# Patient Record
Sex: FEMALE | Race: WHITE | NOT HISPANIC OR LATINO | Employment: STUDENT | URBAN - METROPOLITAN AREA
[De-identification: names, ages, dates, MRNs, and addresses within clinical notes are randomized per-mention and may not be internally consistent; named-entity substitution may affect disease eponyms.]

---
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
## 2022-07-26 PROBLEM — F12.20 CANNABIS DEPENDENCE, DAILY USE (HCC): Status: ACTIVE | Noted: 2022-07-26

## 2022-07-26 PROBLEM — S06.0X0A CONCUSSION WITHOUT LOSS OF CONSCIOUSNESS: Status: RESOLVED | Noted: 2017-08-10 | Resolved: 2022-07-26

## 2022-07-26 PROBLEM — F41.9 ANXIETY DISORDER, UNSPECIFIED: Status: ACTIVE | Noted: 2022-07-26

## 2024-08-06 ENCOUNTER — APPOINTMENT (OUTPATIENT)
Dept: CT IMAGING | Facility: HOSPITAL | Age: 24
End: 2024-08-06
Attending: EMERGENCY MEDICINE
Payer: COMMERCIAL

## 2024-08-06 ENCOUNTER — HOSPITAL ENCOUNTER (EMERGENCY)
Facility: HOSPITAL | Age: 24
Discharge: HOME | End: 2024-08-06
Attending: EMERGENCY MEDICINE
Payer: COMMERCIAL

## 2024-08-06 VITALS
DIASTOLIC BLOOD PRESSURE: 51 MMHG | WEIGHT: 135 LBS | RESPIRATION RATE: 16 BRPM | HEIGHT: 63 IN | TEMPERATURE: 101 F | BODY MASS INDEX: 23.92 KG/M2 | SYSTOLIC BLOOD PRESSURE: 109 MMHG | HEART RATE: 90 BPM | OXYGEN SATURATION: 99 %

## 2024-08-06 DIAGNOSIS — N12 PYELONEPHRITIS: Primary | ICD-10-CM

## 2024-08-06 LAB
ALBUMIN SERPL-MCNC: 4.6 G/DL (ref 3.2–4.8)
ALBUMIN/GLOB SERPL: 1.6 {RATIO} (ref 1–2)
ALP LIVER SERPL-CCNC: 68 U/L
ALT SERPL-CCNC: 10 U/L
ANION GAP SERPL CALC-SCNC: 8 MMOL/L (ref 0–18)
AST SERPL-CCNC: 18 U/L (ref ?–34)
B-HCG UR QL: NEGATIVE
BASOPHILS # BLD AUTO: 0.02 X10(3) UL (ref 0–0.2)
BASOPHILS NFR BLD AUTO: 0.1 %
BILIRUB SERPL-MCNC: 0.6 MG/DL (ref 0.3–1.2)
BILIRUB UR QL: NEGATIVE
BUN BLD-MCNC: 10 MG/DL (ref 9–23)
BUN/CREAT SERPL: 12.5 (ref 10–20)
CALCIUM BLD-MCNC: 9.5 MG/DL (ref 8.7–10.4)
CHLORIDE SERPL-SCNC: 103 MMOL/L (ref 98–112)
CO2 SERPL-SCNC: 25 MMOL/L (ref 21–32)
CREAT BLD-MCNC: 0.8 MG/DL
DEPRECATED RDW RBC AUTO: 40.4 FL (ref 35.1–46.3)
EGFRCR SERPLBLD CKD-EPI 2021: 106 ML/MIN/1.73M2 (ref 60–?)
EOSINOPHIL # BLD AUTO: 0 X10(3) UL (ref 0–0.7)
EOSINOPHIL NFR BLD AUTO: 0 %
ERYTHROCYTE [DISTWIDTH] IN BLOOD BY AUTOMATED COUNT: 12.3 % (ref 11–15)
GLOBULIN PLAS-MCNC: 2.9 G/DL (ref 2–3.5)
GLUCOSE BLD-MCNC: 112 MG/DL (ref 70–99)
GLUCOSE UR-MCNC: NORMAL MG/DL
HCT VFR BLD AUTO: 36.3 %
HGB BLD-MCNC: 12.8 G/DL
IMM GRANULOCYTES # BLD AUTO: 0.05 X10(3) UL (ref 0–1)
IMM GRANULOCYTES NFR BLD: 0.4 %
LEUKOCYTE ESTERASE UR QL STRIP.AUTO: 500
LIPASE SERPL-CCNC: 26 U/L (ref 12–53)
LYMPHOCYTES # BLD AUTO: 0.5 X10(3) UL (ref 1–4)
LYMPHOCYTES NFR BLD AUTO: 3.5 %
MCH RBC QN AUTO: 31.3 PG (ref 26–34)
MCHC RBC AUTO-ENTMCNC: 35.3 G/DL (ref 31–37)
MCV RBC AUTO: 88.8 FL
MONOCYTES # BLD AUTO: 1.01 X10(3) UL (ref 0.1–1)
MONOCYTES NFR BLD AUTO: 7.1 %
NEUTROPHILS # BLD AUTO: 12.61 X10 (3) UL (ref 1.5–7.7)
NEUTROPHILS # BLD AUTO: 12.61 X10(3) UL (ref 1.5–7.7)
NEUTROPHILS NFR BLD AUTO: 88.9 %
OSMOLALITY SERPL CALC.SUM OF ELEC: 282 MOSM/KG (ref 275–295)
PH UR: 8 [PH] (ref 5–8)
PLATELET # BLD AUTO: 232 10(3)UL (ref 150–450)
POTASSIUM SERPL-SCNC: 3.6 MMOL/L (ref 3.5–5.1)
PROT SERPL-MCNC: 7.5 G/DL (ref 5.7–8.2)
PROT UR-MCNC: 50 MG/DL
RBC # BLD AUTO: 4.09 X10(6)UL
RBC #/AREA URNS AUTO: >10 /HPF
SODIUM SERPL-SCNC: 136 MMOL/L (ref 136–145)
SP GR UR STRIP: 1.01 (ref 1–1.03)
UROBILINOGEN UR STRIP-ACNC: NORMAL
WBC # BLD AUTO: 14.2 X10(3) UL (ref 4–11)
WBC #/AREA URNS AUTO: >50 /HPF

## 2024-08-06 PROCEDURE — 99284 EMERGENCY DEPT VISIT MOD MDM: CPT

## 2024-08-06 PROCEDURE — 80053 COMPREHEN METABOLIC PANEL: CPT | Performed by: EMERGENCY MEDICINE

## 2024-08-06 PROCEDURE — 96375 TX/PRO/DX INJ NEW DRUG ADDON: CPT

## 2024-08-06 PROCEDURE — 99285 EMERGENCY DEPT VISIT HI MDM: CPT

## 2024-08-06 PROCEDURE — 96365 THER/PROPH/DIAG IV INF INIT: CPT

## 2024-08-06 PROCEDURE — 81001 URINALYSIS AUTO W/SCOPE: CPT | Performed by: EMERGENCY MEDICINE

## 2024-08-06 PROCEDURE — 83690 ASSAY OF LIPASE: CPT | Performed by: EMERGENCY MEDICINE

## 2024-08-06 PROCEDURE — 74176 CT ABD & PELVIS W/O CONTRAST: CPT | Performed by: EMERGENCY MEDICINE

## 2024-08-06 PROCEDURE — 85025 COMPLETE CBC W/AUTO DIFF WBC: CPT | Performed by: EMERGENCY MEDICINE

## 2024-08-06 PROCEDURE — 96361 HYDRATE IV INFUSION ADD-ON: CPT

## 2024-08-06 PROCEDURE — 81025 URINE PREGNANCY TEST: CPT

## 2024-08-06 PROCEDURE — 87086 URINE CULTURE/COLONY COUNT: CPT | Performed by: EMERGENCY MEDICINE

## 2024-08-06 RX ORDER — KETOROLAC TROMETHAMINE 15 MG/ML
15 INJECTION, SOLUTION INTRAMUSCULAR; INTRAVENOUS ONCE
Status: COMPLETED | OUTPATIENT
Start: 2024-08-06 | End: 2024-08-06

## 2024-08-06 RX ORDER — CEFDINIR 300 MG/1
300 CAPSULE ORAL 2 TIMES DAILY
Qty: 20 CAPSULE | Refills: 0 | Status: SHIPPED | OUTPATIENT
Start: 2024-08-06 | End: 2024-08-06

## 2024-08-06 RX ORDER — IBUPROFEN 600 MG/1
600 TABLET ORAL EVERY 8 HOURS PRN
Qty: 15 TABLET | Refills: 0 | Status: SHIPPED | OUTPATIENT
Start: 2024-08-06 | End: 2024-08-12

## 2024-08-06 RX ORDER — ONDANSETRON 4 MG/1
4 TABLET, ORALLY DISINTEGRATING ORAL EVERY 8 HOURS PRN
Qty: 6 TABLET | Refills: 0 | Status: SHIPPED | OUTPATIENT
Start: 2024-08-06 | End: 2024-08-12

## 2024-08-06 RX ORDER — ACETAMINOPHEN 325 MG/1
650 TABLET ORAL ONCE
Status: COMPLETED | OUTPATIENT
Start: 2024-08-06 | End: 2024-08-06

## 2024-08-06 RX ORDER — ONDANSETRON 4 MG/1
4 TABLET, ORALLY DISINTEGRATING ORAL EVERY 8 HOURS PRN
Qty: 6 TABLET | Refills: 0 | Status: SHIPPED | OUTPATIENT
Start: 2024-08-06 | End: 2024-08-06

## 2024-08-06 RX ORDER — ONDANSETRON 2 MG/ML
4 INJECTION INTRAMUSCULAR; INTRAVENOUS ONCE
Status: COMPLETED | OUTPATIENT
Start: 2024-08-06 | End: 2024-08-06

## 2024-08-06 RX ORDER — IBUPROFEN 600 MG/1
600 TABLET ORAL EVERY 8 HOURS PRN
Qty: 15 TABLET | Refills: 0 | Status: SHIPPED | OUTPATIENT
Start: 2024-08-06 | End: 2024-08-06

## 2024-08-06 RX ORDER — CEFDINIR 300 MG/1
300 CAPSULE ORAL 2 TIMES DAILY
Qty: 20 CAPSULE | Refills: 0 | Status: SHIPPED | OUTPATIENT
Start: 2024-08-06 | End: 2024-08-12

## 2024-08-06 NOTE — ED PROVIDER NOTES
Patient Seen in: [facility] Emergency Department      History     Chief Complaint   Patient presents with    Flank Pain     Stated Complaint: R Flank Pain    Subjective:   HPI    25 y/o female here w/ Mom for eval of R flank pain, some nausea and concern for recurrence of kidney infection.  She lives in [place] but is visiting.  About a month ago she was treated with antibiotics after visiting a medical facility ER and diagnosed with a left kidney infection.  No significant history.  No abdominal surgical history.  No fever.  Mom did give her a dose of Levaquin and pretty in the left of her home this morning but she vomited up shortly thereafter.  No family or personal history of kidney stones but no imaging at that time reportedly.    Objective:   Past Medical History:    Anxiety    Concussion without loss of consciousness    4/2017    Depression    Encounter for IUD insertion    Panic attacks    Patellofemoral syndrome, bilateral              Past Surgical History:   Procedure Laterality Date    Mirena, iud  12/18/2020    Sinus surgery    07/24/2020                Social History     Socioeconomic History    Marital status: Single   Tobacco Use    Smoking status: Never    Smokeless tobacco: Never   Vaping Use    Vaping status: Never Used   Substance and Sexual Activity    Alcohol use: Yes     Alcohol/week: 10.0 - 12.0 standard drinks of alcohol     Types: 10 - 12 Standard drinks or equivalent per week     Comment: weekends    Drug use: Yes     Frequency: 7.0 times per week     Types: Cannabis     Comment: smokes plant form nightly    Sexual activity: Yes     Partners: Male     Birth control/protection: I.U.D.              Review of Systems    Positive for stated Chief Complaint: Flank Pain    Other systems are as noted in HPI.  Constitutional and vital signs reviewed.      All other systems reviewed and negative except as noted above.    Physical Exam     ED Triage Vitals [08/06/24 1439]   /68   Pulse  94   Resp 16   Temp 99 °F (37.2 °C)   Temp src Oral   SpO2 99 %   O2 Device None (Room air)       Current Vitals:   Vital Signs  BP: 111/62  Pulse: 81  Resp: 16  Temp: (!) 101 °F (38.3 °C)  Temp src: Temporal  MAP (mmHg): 84    Oxygen Therapy  SpO2: 100 %  O2 Device: None (Room air)            Physical Exam    Constitutional: Oriented to person, place, and time.  Appears well-developed. No distress.   Head: Normocephalic and atraumatic.   Eyes: Conjunctivae are normal. Pupils are equal, round, and reactive to light.   Cardiovascular: Normal rate, regular rhythm and intact distal pulses.    Pulmonary/Chest: Effort normal. No respiratory distress.   Abdominal: Soft. There is no tenderness. There is no guarding.  Mild right flank pain only.  Musculoskeletal: Normal range of motion. No edema or tenderness.   Neurological: Alert and oriented to person, place, and time.   Skin: Skin is warm and dry.     Nursing note and vitals reviewed.    Differential diagnosis includes recurrence UTI and pyelonephritis, obstructive uropathy, less likely perinephric abscess, renal insufficiency.      ED Course     Labs Reviewed   CBC WITH DIFFERENTIAL WITH PLATELET - Abnormal; Notable for the following components:       Result Value    WBC 14.2 (*)     Neutrophil Absolute Prelim 12.61 (*)     Neutrophil Absolute 12.61 (*)     Lymphocyte Absolute 0.50 (*)     Monocyte Absolute 1.01 (*)     All other components within normal limits   COMP METABOLIC PANEL (14) - Abnormal; Notable for the following components:    Glucose 112 (*)     All other components within normal limits   URINALYSIS WITH CULTURE REFLEX - Abnormal; Notable for the following components:    Clarity Urine Turbid (*)     Ketones Urine Trace (*)     Blood Urine 1+ (*)     Protein Urine 50 (*)     Nitrite Urine 1+ (*)     Leukocyte Esterase Urine 500 (*)     WBC Urine >50 (*)     RBC Urine >10 (*)     Bacteria Urine 1+ (*)     Squamous Epi. Cells Few (*)     All other  components within normal limits   LIPASE - Normal   POCT PREGNANCY URINE - Normal   URINE CULTURE, ROUTINE             CT ABDOMEN+PELVIS(CPT=74176)    Result Date: 8/6/2024  PROCEDURE:   CT ABDOMEN+PELVIS(CPT=74176)  COMPARISON:   None.  INDICATIONS:   Right flank pain.  TECHNIQUE: CT images of the abdomen and pelvis were obtained without intravenous contrast material.  Automated exposure control for dose reduction was used. Adjustment of the mA and/or kV was done based on the patient's size. Use of iterative reconstruction technique for dose reduction was used.  Dose information is transmitted to the ACR (American College of Radiology) NRDR (National Radiology Data Registry) which includes the Dose Index Registry.  FINDINGS:  URINARY TRACT: Asymmetric right perinephric stranding with trace perinephric fluid.  Mild urothelial thickening involving the right renal pelvis seen on image 59 series 2, image 29 series 5. .  No contour deforming renal mass. No hydroureteronephrosis or  urinary tract calculus.  Mild mural thickening of urinary bladder. ADRENALS: Normal unenhanced adrenals.  LIVER: Normal unenhanced liver. BILIARY: No evidence for cholecystitis or biliary dilatation. PANCREAS: Normal unenhanced pancreas.  SPLEEN: Normal unenhanced spleen.  AORTA/VASCULAR: No aneurysm. LYMPHADENOPATHY: None. GI/MESENTERY: Normal appendix.  Large amount of stool in the colon.  No obstruction, bowel wall thickening or mesenteric mass.  ABDOMINAL WALL: Normal.  No mass or hernia.  ASCITES:   None PELVIC ORGANS: No suspect pelvic mass.  Intrauterine device. BONES: No suspect bone lesion.  Mild levoscoliosis. LUNG BASES: Normal.  OTHER: Negative.            CONCLUSION:  1. Cystitis with ascending urinary tract infection involving right renal collecting system and kidney.  Based on perinephric stranding and trace fluid suspect right pyelonephritis.  No urinary tract calculus.     Dictated by (CST): Hayden Cameron MD on 8/06/2024 at  5:57 PM     Finalized by (CST): Hayden Cameron MD on 8/06/2024 at 6:02 PM                  [facility]                                         Medical Decision Making  Blood pressure has been stable.  Not tachycardic.  Fever resolved.  Feels improved.  1 dose of Rocephin.  Will go on Omnicef fluids ibuprofen and Zofran.  Encourage completion of antibiotics.  Follow-up with her doctor come back with worsening or change.  Tylenol for pain as well.    Problems Addressed:  Pyelonephritis: acute illness or injury with systemic symptoms    Amount and/or Complexity of Data Reviewed  Labs: ordered. Decision-making details documented in ED Course.  Radiology: ordered and independent interpretation performed. Decision-making details documented in ED Course.     Details: By my review of the CT abdomen/pelvis there is no obvious evidence of bowel obstruction, free intraperitoneal air or significant free fluid.    Risk  OTC drugs.  Prescription drug management.  Decision regarding hospitalization.        Disposition and Plan     Clinical Impression:  1. Pyelonephritis         Disposition:  Discharge  8/6/2024  6:41 pm    Follow-up:  Bob Rodriguez MD  [address] [place] [address] [id number] [phone]    Call  As needed          Medications Prescribed:  Current Discharge Medication List        START taking these medications    Details   cefdinir 300 MG Oral Cap Take 1 capsule (300 mg total) by mouth 2 (two) times daily for 10 days.  Qty: 20 capsule, Refills: 0      ondansetron 4 MG Oral Tablet Dispersible Take 1 tablet (4 mg total) by mouth every 8 (eight) hours as needed for Nausea.  Qty: 6 tablet, Refills: 0      ibuprofen 600 MG Oral Tab Take 1 tablet (600 mg total) by mouth every 8 (eight) hours as needed for Pain or Fever.  Qty: 15 tablet, Refills: 0

## 2024-08-06 NOTE — ED INITIAL ASSESSMENT (HPI)
Pt came in for right flank pain since yesterday.  With N/V.  Pt is A/Ox  4, breathing unlabored.  Per mom pt had 1 dose of Levaquin today but threw ap after.

## 2024-08-10 ENCOUNTER — APPOINTMENT (OUTPATIENT)
Dept: ULTRASOUND IMAGING | Facility: HOSPITAL | Age: 24
End: 2024-08-10
Attending: EMERGENCY MEDICINE
Payer: COMMERCIAL

## 2024-08-10 ENCOUNTER — HOSPITAL ENCOUNTER (OUTPATIENT)
Facility: HOSPITAL | Age: 24
Setting detail: OBSERVATION
Discharge: HOME | End: 2024-08-12
Attending: EMERGENCY MEDICINE | Admitting: INTERNAL MEDICINE
Payer: COMMERCIAL

## 2024-08-10 DIAGNOSIS — R10.9 RIGHT FLANK PAIN: ICD-10-CM

## 2024-08-10 DIAGNOSIS — N12 PYELONEPHRITIS: Primary | ICD-10-CM

## 2024-08-10 LAB
ALBUMIN SERPL-MCNC: 4.2 G/DL (ref 3.2–4.8)
ALBUMIN/GLOB SERPL: 1.4 {RATIO} (ref 1–2)
ALP LIVER SERPL-CCNC: 63 U/L
ALT SERPL-CCNC: 11 U/L
ANION GAP SERPL CALC-SCNC: 6 MMOL/L (ref 0–18)
AST SERPL-CCNC: 12 U/L (ref ?–34)
B-HCG UR QL: NEGATIVE
BASOPHILS # BLD AUTO: 0.03 X10(3) UL (ref 0–0.2)
BASOPHILS NFR BLD AUTO: 0.3 %
BILIRUB SERPL-MCNC: 0.2 MG/DL (ref 0.3–1.2)
BILIRUB UR QL: NEGATIVE
BUN BLD-MCNC: 7 MG/DL (ref 9–23)
BUN/CREAT SERPL: 10.4 (ref 10–20)
CALCIUM BLD-MCNC: 9.6 MG/DL (ref 8.7–10.4)
CHLORIDE SERPL-SCNC: 105 MMOL/L (ref 98–112)
CLARITY UR: CLEAR
CO2 SERPL-SCNC: 27 MMOL/L (ref 21–32)
COLOR UR: COLORLESS
CREAT BLD-MCNC: 0.67 MG/DL
DEPRECATED RDW RBC AUTO: 43.5 FL (ref 35.1–46.3)
EGFRCR SERPLBLD CKD-EPI 2021: 126 ML/MIN/1.73M2 (ref 60–?)
EOSINOPHIL # BLD AUTO: 0.04 X10(3) UL (ref 0–0.7)
EOSINOPHIL NFR BLD AUTO: 0.4 %
ERYTHROCYTE [DISTWIDTH] IN BLOOD BY AUTOMATED COUNT: 13 % (ref 11–15)
GLOBULIN PLAS-MCNC: 2.9 G/DL (ref 2–3.5)
GLUCOSE BLD-MCNC: 111 MG/DL (ref 70–99)
GLUCOSE UR-MCNC: NORMAL MG/DL
HCT VFR BLD AUTO: 33.3 %
HGB BLD-MCNC: 11.3 G/DL
HGB UR QL STRIP.AUTO: NEGATIVE
IMM GRANULOCYTES # BLD AUTO: 0.04 X10(3) UL (ref 0–1)
IMM GRANULOCYTES NFR BLD: 0.4 %
KETONES UR-MCNC: NEGATIVE MG/DL
LEUKOCYTE ESTERASE UR QL STRIP.AUTO: NEGATIVE
LYMPHOCYTES # BLD AUTO: 2.06 X10(3) UL (ref 1–4)
LYMPHOCYTES NFR BLD AUTO: 22.2 %
MCH RBC QN AUTO: 30.9 PG (ref 26–34)
MCHC RBC AUTO-ENTMCNC: 33.9 G/DL (ref 31–37)
MCV RBC AUTO: 91 FL
MONOCYTES # BLD AUTO: 1.01 X10(3) UL (ref 0.1–1)
MONOCYTES NFR BLD AUTO: 10.9 %
NEUTROPHILS # BLD AUTO: 6.08 X10 (3) UL (ref 1.5–7.7)
NEUTROPHILS # BLD AUTO: 6.08 X10(3) UL (ref 1.5–7.7)
NEUTROPHILS NFR BLD AUTO: 65.8 %
NITRITE UR QL STRIP.AUTO: NEGATIVE
OSMOLALITY SERPL CALC.SUM OF ELEC: 285 MOSM/KG (ref 275–295)
PH UR: 5.5 [PH] (ref 5–8)
PLATELET # BLD AUTO: 273 10(3)UL (ref 150–450)
POTASSIUM SERPL-SCNC: 3.2 MMOL/L (ref 3.5–5.1)
PROT SERPL-MCNC: 7.1 G/DL (ref 5.7–8.2)
PROT UR-MCNC: NEGATIVE MG/DL
RBC # BLD AUTO: 3.66 X10(6)UL
SODIUM SERPL-SCNC: 138 MMOL/L (ref 136–145)
SP GR UR STRIP: 1.01 (ref 1–1.03)
UROBILINOGEN UR STRIP-ACNC: NORMAL
WBC # BLD AUTO: 9.3 X10(3) UL (ref 4–11)

## 2024-08-10 PROCEDURE — 96365 THER/PROPH/DIAG IV INF INIT: CPT

## 2024-08-10 PROCEDURE — 80053 COMPREHEN METABOLIC PANEL: CPT | Performed by: EMERGENCY MEDICINE

## 2024-08-10 PROCEDURE — 96361 HYDRATE IV INFUSION ADD-ON: CPT

## 2024-08-10 PROCEDURE — 36415 COLL VENOUS BLD VENIPUNCTURE: CPT

## 2024-08-10 PROCEDURE — 76770 US EXAM ABDO BACK WALL COMP: CPT | Performed by: EMERGENCY MEDICINE

## 2024-08-10 PROCEDURE — 99285 EMERGENCY DEPT VISIT HI MDM: CPT

## 2024-08-10 PROCEDURE — 81025 URINE PREGNANCY TEST: CPT

## 2024-08-10 PROCEDURE — 87040 BLOOD CULTURE FOR BACTERIA: CPT | Performed by: EMERGENCY MEDICINE

## 2024-08-10 PROCEDURE — 96375 TX/PRO/DX INJ NEW DRUG ADDON: CPT

## 2024-08-10 PROCEDURE — 85025 COMPLETE CBC W/AUTO DIFF WBC: CPT | Performed by: EMERGENCY MEDICINE

## 2024-08-10 PROCEDURE — 81003 URINALYSIS AUTO W/O SCOPE: CPT | Performed by: EMERGENCY MEDICINE

## 2024-08-10 RX ORDER — ONDANSETRON 2 MG/ML
4 INJECTION INTRAMUSCULAR; INTRAVENOUS ONCE
Status: COMPLETED | OUTPATIENT
Start: 2024-08-10 | End: 2024-08-10

## 2024-08-10 RX ORDER — POTASSIUM CHLORIDE 1.5 G/1.58G
40 POWDER, FOR SOLUTION ORAL ONCE
Status: COMPLETED | OUTPATIENT
Start: 2024-08-10 | End: 2024-08-10

## 2024-08-10 RX ORDER — KETOROLAC TROMETHAMINE 15 MG/ML
15 INJECTION, SOLUTION INTRAMUSCULAR; INTRAVENOUS ONCE
Status: COMPLETED | OUTPATIENT
Start: 2024-08-10 | End: 2024-08-10

## 2024-08-11 PROBLEM — R10.9 RIGHT FLANK PAIN: Status: ACTIVE | Noted: 2024-08-11

## 2024-08-11 LAB
ALBUMIN SERPL-MCNC: 3.8 G/DL (ref 3.2–4.8)
ALBUMIN/GLOB SERPL: 1.4 {RATIO} (ref 1–2)
ALP LIVER SERPL-CCNC: 54 U/L
ALT SERPL-CCNC: 10 U/L
ANION GAP SERPL CALC-SCNC: 7 MMOL/L (ref 0–18)
AST SERPL-CCNC: 11 U/L (ref ?–34)
BASOPHILS # BLD AUTO: 0.03 X10(3) UL (ref 0–0.2)
BASOPHILS NFR BLD AUTO: 0.4 %
BILIRUB SERPL-MCNC: 0.3 MG/DL (ref 0.3–1.2)
BUN BLD-MCNC: 7 MG/DL (ref 9–23)
BUN/CREAT SERPL: 10.9 (ref 10–20)
CALCIUM BLD-MCNC: 9.2 MG/DL (ref 8.7–10.4)
CHLORIDE SERPL-SCNC: 109 MMOL/L (ref 98–112)
CO2 SERPL-SCNC: 27 MMOL/L (ref 21–32)
CREAT BLD-MCNC: 0.64 MG/DL
DEPRECATED RDW RBC AUTO: 43.5 FL (ref 35.1–46.3)
EGFRCR SERPLBLD CKD-EPI 2021: 127 ML/MIN/1.73M2 (ref 60–?)
EOSINOPHIL # BLD AUTO: 0.07 X10(3) UL (ref 0–0.7)
EOSINOPHIL NFR BLD AUTO: 1 %
ERYTHROCYTE [DISTWIDTH] IN BLOOD BY AUTOMATED COUNT: 13.1 % (ref 11–15)
GLOBULIN PLAS-MCNC: 2.7 G/DL (ref 2–3.5)
GLUCOSE BLD-MCNC: 90 MG/DL (ref 70–99)
HCT VFR BLD AUTO: 32.3 %
HGB BLD-MCNC: 11.1 G/DL
IMM GRANULOCYTES # BLD AUTO: 0.03 X10(3) UL (ref 0–1)
IMM GRANULOCYTES NFR BLD: 0.4 %
LYMPHOCYTES # BLD AUTO: 2.62 X10(3) UL (ref 1–4)
LYMPHOCYTES NFR BLD AUTO: 36.9 %
MCH RBC QN AUTO: 31 PG (ref 26–34)
MCHC RBC AUTO-ENTMCNC: 34.4 G/DL (ref 31–37)
MCV RBC AUTO: 90.2 FL
MONOCYTES # BLD AUTO: 1.08 X10(3) UL (ref 0.1–1)
MONOCYTES NFR BLD AUTO: 15.2 %
NEUTROPHILS # BLD AUTO: 3.27 X10 (3) UL (ref 1.5–7.7)
NEUTROPHILS # BLD AUTO: 3.27 X10(3) UL (ref 1.5–7.7)
NEUTROPHILS NFR BLD AUTO: 46.1 %
OSMOLALITY SERPL CALC.SUM OF ELEC: 294 MOSM/KG (ref 275–295)
PLATELET # BLD AUTO: 254 10(3)UL (ref 150–450)
POTASSIUM SERPL-SCNC: 4.7 MMOL/L (ref 3.5–5.1)
PROT SERPL-MCNC: 6.5 G/DL (ref 5.7–8.2)
RBC # BLD AUTO: 3.58 X10(6)UL
SODIUM SERPL-SCNC: 143 MMOL/L (ref 136–145)
WBC # BLD AUTO: 7.1 X10(3) UL (ref 4–11)

## 2024-08-11 PROCEDURE — 80053 COMPREHEN METABOLIC PANEL: CPT | Performed by: INTERNAL MEDICINE

## 2024-08-11 PROCEDURE — 96366 THER/PROPH/DIAG IV INF ADDON: CPT

## 2024-08-11 PROCEDURE — 85025 COMPLETE CBC W/AUTO DIFF WBC: CPT | Performed by: INTERNAL MEDICINE

## 2024-08-11 PROCEDURE — 96376 TX/PRO/DX INJ SAME DRUG ADON: CPT

## 2024-08-11 RX ORDER — KETOROLAC TROMETHAMINE 15 MG/ML
15 INJECTION, SOLUTION INTRAMUSCULAR; INTRAVENOUS EVERY 6 HOURS PRN
Status: DISCONTINUED | OUTPATIENT
Start: 2024-08-11 | End: 2024-08-12

## 2024-08-11 RX ORDER — FLUOXETINE HYDROCHLORIDE 20 MG/1
40 CAPSULE ORAL DAILY
Status: DISCONTINUED | OUTPATIENT
Start: 2024-08-11 | End: 2024-08-11

## 2024-08-11 RX ORDER — MULTIPLE VITAMINS W/ MINERALS TAB 9MG-400MCG
1 TAB ORAL DAILY
Status: DISCONTINUED | OUTPATIENT
Start: 2024-08-11 | End: 2024-08-12

## 2024-08-11 RX ORDER — ONDANSETRON 2 MG/ML
4 INJECTION INTRAMUSCULAR; INTRAVENOUS EVERY 6 HOURS PRN
Status: DISCONTINUED | OUTPATIENT
Start: 2024-08-11 | End: 2024-08-12

## 2024-08-11 RX ORDER — ESCITALOPRAM OXALATE 20 MG/1
20 TABLET ORAL NIGHTLY
Status: DISCONTINUED | OUTPATIENT
Start: 2024-08-11 | End: 2024-08-12

## 2024-08-11 RX ORDER — ACETAMINOPHEN 325 MG/1
650 TABLET ORAL EVERY 6 HOURS PRN
Status: DISCONTINUED | OUTPATIENT
Start: 2024-08-11 | End: 2024-08-12

## 2024-08-11 RX ORDER — ALBUTEROL SULFATE 90 UG/1
1 AEROSOL, METERED RESPIRATORY (INHALATION) 4 TIMES DAILY
Status: DISCONTINUED | OUTPATIENT
Start: 2024-08-11 | End: 2024-08-12

## 2024-08-11 RX ORDER — ONDANSETRON 4 MG/1
4 TABLET, ORALLY DISINTEGRATING ORAL EVERY 8 HOURS PRN
Status: DISCONTINUED | OUTPATIENT
Start: 2024-08-11 | End: 2024-08-12

## 2024-08-11 RX ORDER — ISOTRETINOIN 40 MG/1
80 CAPSULE ORAL DAILY
COMMUNITY

## 2024-08-11 RX ORDER — FLUOXETINE HYDROCHLORIDE 40 MG/1
40 CAPSULE ORAL DAILY
COMMUNITY
End: 2024-08-12

## 2024-08-11 NOTE — ED QUICK NOTES
Orders for admission, patient is aware of plan and ready to go upstairs. Any questions, please call ED RN Kiya at extension [phone].     Patient Covid vaccination status: Partially vaccinated     COVID Test Ordered in ED: None    COVID Suspicion at Admission: N/A    Running Infusions:      Mental Status/LOC at time of transport: AOx4    Other pertinent information: from home, up ad jossue  CIWA score: N/A   NIH score:  N/A

## 2024-08-11 NOTE — PLAN OF CARE
Seen by dr. Roberson & updated, cont iv abx, overall feels better, see orders, notes, assessment, dr mendoza states will see pt in the am & determine care & meds, pt & family v/I poc, cont to monitor & maintain comfort & safety.  Problem: Patient Centered Care  Goal: Patient preferences are identified and integrated in the patient's plan of care  Description: Interventions:  - What would you like us to know as we care for you? Here visitng family when this happened  - Provide timely, complete, and accurate information to patient/family  - Incorporate patient and family knowledge, values, beliefs, and cultural backgrounds into the planning and delivery of care  - Encourage patient/family to participate in care and decision-making at the level they choose  - Honor patient and family perspectives and choices  Outcome: Progressing      Problem: PAIN - ADULT  Goal: Verbalizes/displays adequate comfort level or patient's stated pain goal  Description: INTERVENTIONS:  - Encourage pt to monitor pain and request assistance  - Assess pain using appropriate pain scale  - Administer analgesics based on type and severity of pain and evaluate response  - Implement non-pharmacological measures as appropriate and evaluate response  - Consider cultural and social influences on pain and pain management  - Manage/alleviate anxiety  - Utilize distraction and/or relaxation techniques  - Monitor for opioid side effects  - Notify MD/LIP if interventions unsuccessful or patient reports new pain  - Anticipate increased pain with activity and pre-medicate as appropriate  Outcome: Progressing     Problem: RISK FOR INFECTION - ADULT  Goal: Absence of fever/infection during anticipated neutropenic period  Description: INTERVENTIONS  - Monitor WBC  - Administer growth factors as ordered  - Implement neutropenic guidelines  Outcome: Progressing     Problem: SAFETY ADULT - FALL  Goal: Free from fall injury  Description: INTERVENTIONS:  - Assess pt  frequently for physical needs  - Identify cognitive and physical deficits and behaviors that affect risk of falls.  - [place] fall precautions as indicated by assessment.  - Educate pt/family on patient safety including physical limitations  - Instruct pt to call for assistance with activity based on assessment  - Modify environment to reduce risk of injury  - Provide assistive devices as appropriate  - Consider OT/PT consult to assist with strengthening/mobility  - Encourage toileting schedule  Outcome: Progressing

## 2024-08-11 NOTE — PLAN OF CARE
Problem: SAFETY ADULT - FALL  Goal: Free from fall injury  Description: INTERVENTIONS:  - Assess pt frequently for physical needs  - Identify cognitive and physical deficits and behaviors that affect risk of falls.  - [place] fall precautions as indicated by assessment.  - Educate pt/family on patient safety including physical limitations  - Instruct pt to call for assistance with activity based on assessment  - Modify environment to reduce risk of injury  - Provide assistive devices as appropriate  - Consider OT/PT consult to assist with strengthening/mobility  - Encourage toileting schedule  Outcome: Progressing     Problem: RISK FOR INFECTION - ADULT  Goal: Absence of fever/infection during anticipated neutropenic period  Description: INTERVENTIONS  - Monitor WBC  - Administer growth factors as ordered  - Implement neutropenic guidelines  Outcome: Progressing     Problem: PAIN - ADULT  Goal: Verbalizes/displays adequate comfort level or patient's stated pain goal  Description: INTERVENTIONS:  - Encourage pt to monitor pain and request assistance  - Assess pain using appropriate pain scale  - Administer analgesics based on type and severity of pain and evaluate response  - Implement non-pharmacological measures as appropriate and evaluate response  - Consider cultural and social influences on pain and pain management  - Manage/alleviate anxiety  - Utilize distraction and/or relaxation techniques  - Monitor for opioid side effects  - Notify MD/LIP if interventions unsuccessful or patient reports new pain  - Anticipate increased pain with activity and pre-medicate as appropriate  Outcome: Progressing    Aox4  VSS, Afebrile  IV Rocephin  Self in room  Safety precautions maintained

## 2024-08-11 NOTE — PROGRESS NOTES
[facility] Pharmacy Note: Antimicrobial Dose Adjustment for: ceftriaxone (ROCEPHIN)    Valeria Lopez is a 24 year old patient who has been prescribed ceftriaxone (ROCEPHIN) 1gm every 24 hours.    Estimated Creatinine Clearance: 108 mL/min (based on SCr of 0.67 mg/dL).  Wt Readings from Last 6 Encounters:   08/11/24 64.5 kg (142 lb 1.6 oz)   08/06/24 61.2 kg (135 lb)   12/18/20 66.7 kg (147 lb) (77%, Z= 0.75)*   11/30/20 66 kg (145 lb 8 oz) (76%, Z= 0.70)*   07/24/20 63.5 kg (140 lb) (70%, Z= 0.54)*   07/22/20 64.9 kg (143 lb) (74%, Z= 0.65)*     * Growth percentiles are based on CDC (Girls, 2-20 Years) data.     Body mass index is 25.17 kg/m².    Based on this criteria and renal function, dose will be adjusted to ceftriaxone (ROCEPHIN) 2gm every 24 hours.    Thank you,    Ben White, PharmD  8/11/2024  1:00 AM

## 2024-08-11 NOTE — ED INITIAL ASSESSMENT (HPI)
Patient arrives ambulatory through triage with c/o of R. Flank pain as well as bladder pain. Patient states seen in this ED previously, sent home with antibiotics.

## 2024-08-11 NOTE — ED PROVIDER NOTES
Patient Seen in: [facility] Emergency Department    History     Chief Complaint   Patient presents with    Flank Pain    Bladder Pain     Stated Complaint: flank pain, bladder pain,    HPI    Patient is a very pleasant 24F hx of prior pyelonephritis in July 2024 presents to the ER with fever, flank pain, lower abdominal pain.     On my independent chart review patient was seen here on 8/6/2024 due to right flank pain. At that time had a CT scan with contrast of abdomen/pelvis showing right perinephric stranding and trace perinephric fluid with thickening of the urinary bladder concerning for pyelonephritis. She was started on Cefdinir and discharged home.    Since then patient has continued to have right sided flank pain, nausea, and today developed suprapubic discomfort. She has continued to have fevers at home TMAX 101.3F this afternoon despite taking antibiotics. She has felt fatigued, nauseated, has had a mild headache, fatigue, and feels like she can't take deep breaths. She denies vomiting or diarrhea. No chest pain. No vaginal bleeding nor discharge.       Past Medical History:    Anxiety    Concussion without loss of consciousness    4/2017    Depression    Encounter for IUD insertion    Panic attacks    Patellofemoral syndrome, bilateral       Past Surgical History:   Procedure Laterality Date    Mirena, iud  12/18/2020    Sinus surgery    07/24/2020            Family History   Problem Relation Age of Onset    Cancer Maternal Grandfather     Cancer Other         UNCLE    Arthritis Maternal Grandmother     Thyroid Disorder Maternal Grandmother     Diabetes Paternal Grandfather     Allergies Father     Anxiety Father     Allergies Mother     Anxiety Mother     Anxiety Brother     Depression Brother     Depression Brother     Anxiety Brother     Psychiatric Maternal Uncle        Social History     Socioeconomic History    Marital status: Single   Tobacco Use    Smoking status: Never    Smokeless  tobacco: Never   Vaping Use    Vaping status: Never Used   Substance and Sexual Activity    Alcohol use: Yes     Alcohol/week: 10.0 - 12.0 standard drinks of alcohol     Types: 10 - 12 Standard drinks or equivalent per week     Comment: weekends    Drug use: Yes     Frequency: 7.0 times per week     Types: Cannabis     Comment: smokes plant form nightly    Sexual activity: Yes     Partners: Male     Birth control/protection: I.U.D.     Social Determinants of Health     Food Insecurity: No Food Insecurity (8/11/2024)    Food Insecurity     Food Insecurity: Never true   Transportation Needs: No Transportation Needs (8/11/2024)    Transportation Needs     Lack of Transportation: No   Housing Stability: Low Risk  (8/11/2024)    Housing Stability     Housing Instability: No       Review of Systems    Positive for stated complaint: flank pain, bladder pain,  Other systems are as noted in HPI.  Constitutional and vital signs reviewed.      All other systems reviewed and negative except as noted above.    PSFH elements reviewed from today and agreed except as otherwise stated in HPI.    Physical Exam     ED Triage Vitals   BP 08/10/24 2131 112/64   Pulse 08/10/24 2131 90   Resp 08/10/24 2131 20   Temp 08/10/24 2131 99.8 °F (37.7 °C)   Temp src 08/11/24 0032 Oral   SpO2 08/10/24 2131 99 %   O2 Device 08/10/24 2215 None (Room air)       Current:/68 (BP Location: Right arm)   Pulse 60   Temp 98.4 °F (36.9 °C) (Oral)   Resp 18   Wt 64.5 kg   SpO2 98%   BMI 25.17 kg/m²   GENERAL: alert, no distress  SKIN: good skin turgor, no rashes  HEENT: atraumatic, normocephalic, ears and throat are clear  EYES: sclera non icteric, conjunctiva non-injected  NECK: supple,  LUNGS:no resp distress  CARDIO:regular rate  EXTREMITIES: no cyanosis, clubbing or edema  BACK: +R CVA tenderness  GI: soft, generalized lower abdominal tenderness worse over suprapubic region, no guarding      ED Course     Labs Reviewed   CBC WITH DIFFERENTIAL  WITH PLATELET - Abnormal; Notable for the following components:       Result Value    RBC 3.66 (*)     HGB 11.3 (*)     HCT 33.3 (*)     Monocyte Absolute 1.01 (*)     All other components within normal limits   URINALYSIS WITH CULTURE REFLEX - Abnormal; Notable for the following components:    Urine Color Colorless (*)     All other components within normal limits   COMP METABOLIC PANEL (14) - Abnormal; Notable for the following components:    Glucose 111 (*)     Potassium 3.2 (*)     BUN 7 (*)     Bilirubin, Total 0.2 (*)     All other components within normal limits   POCT PREGNANCY URINE - Normal   CBC WITH DIFFERENTIAL WITH PLATELET   COMP METABOLIC PANEL (14)   RAINBOW DRAW LAVENDER   RAINBOW DRAW LIGHT GREEN   RAINBOW DRAW BLUE   BLOOD CULTURE   BLOOD CULTURE       MDM     This patient presents with persistent R flank pain, fevers, lower abdominal discomfort since dx of pyelonephritis 4d ago. Pt borderline febrile but otherwise VSS here in the ED, though pt notes temp of 101.3F at home earlier today.     I did review pt's CT from 8/6 showing large stool in the colon and changes concerning for R sided pyelonephritis and cystitis    Her urine culture has not had growth to date     Differential diagnoses considered includes, but is not limited to:   Pyelonephritis not responsive to PO antibiotic treatment  Electrolyte abnormality  Sepsis   TAQUERIA  Obstructive kidney stone     Will obtain the following tests: US kidney/bladder, cbc, cmp, bcx, UA/ucx, upreg   Will administer the following medications/therapies: IV NS bolus, rocephin 1g, toradol, zofran     Please see ED course for my independent review of these tests/imaging results.    ED Course as of 08/11/24 0242  ------------------------------------------------------------  Time: 08/10 2249  Value: WBC: 9.3  Comment: Leukocytosis has downtrended, UA is unremarkable no hematuria at this time. Upreg negative. However given pt with persistent fevers at home and  persistent sx will observe on IV antibiotics and follow blood cultures. Pt comfortable with plan.   ------------------------------------------------------------  Time: 08/10 2301  Value: Potassium(!): 3.2  Comment: Will replete mild hypokalemia   ------------------------------------------------------------  Time: 08/10 2303  Comment: Case d/w Dr. Roberson accepts for observation requests ID consult.   ------------------------------------------------------------  Time: 08/10 2356  Comment: Renal ultrasound    Comparison: CT abdomen and pelvis 8/6/2024    IMPRESSION:  No acute findings  Bilateral kidneys appear normal  No renal masses, echogenic/shadowing calculi, or hydronephrosis  Bladder is empty which limits evaluation           Disposition and Plan     Clinical Impression:  1. Pyelonephritis    2. Right flank pain         Disposition:  Admit  8/10/2024 11:04 pm    Follow-up:  No follow-up provider specified.        Medications Prescribed:  Current Discharge Medication List          Mackenzie Cardoso DO  Attending Physician  Emergency Medicine                  Disposition and Plan     Clinical Impression:  1. Pyelonephritis    2. Right flank pain        Disposition:  Admit      Hospital Problems       Present on Admission  Date Reviewed: 11/22/2022            ICD-10-CM Noted POA    * (Principal) Pyelonephritis N12 8/10/2024 Unknown    Right flank pain R10.9 8/11/2024 Unknown        Mackenzie Cardoso DO  Attending Physician  Emergency Medicine

## 2024-08-11 NOTE — H&P
[facility]  part of [facility]    History and Physical    [facility] JOANNE Lopez Patient Status:  Observation    3/8/2000 MRN [id number]   Location [facility] 5SW/SE Attending Melanie Roberson MD   Hosp Day # 0 PCP Bob Rodriguez MD     Date:  2024  Date of Admission:  8/10/2024    History provided by:patient  HPI:     Chief Complaint   Patient presents with    Flank Pain    Bladder Pain     24 year old female with prior pyelonephritis in 2024 presents to the ER with fever, flank pain, lower abdominal pain. Patient had  CT scan  of abd / pelvis done few days ago which showed right perinephric stranding and trace perinephric fluid with thickening of the urinary bladder concerning for pyelonephritis. Patient was started on Cefdinir and sent home. Patient  continued to have right sided flank pain, nausea, and today developed suprapubic discomfort and fevers at home TMAX 101.3F this afternoon despite taking antibiotics. Patient is admitted for IV abx             History     Past Medical History:    Anxiety    Concussion without loss of consciousness    2017    Depression    Encounter for IUD insertion    Panic attacks    Patellofemoral syndrome, bilateral     Past Surgical History:   Procedure Laterality Date    Mirena, iud  2020    Sinus surgery    2020     Family History   Problem Relation Age of Onset    Cancer Maternal Grandfather     Cancer Other         UNCLE    Arthritis Maternal Grandmother     Thyroid Disorder Maternal Grandmother     Diabetes Paternal Grandfather     Allergies Father     Anxiety Father     Allergies Mother     Anxiety Mother     Anxiety Brother     Depression Brother     Depression Brother     Anxiety Brother     Psychiatric Maternal Uncle      Social History:  Social History     Socioeconomic History    Marital status: Single   Tobacco Use    Smoking status: Never    Smokeless tobacco: Never   Vaping Use    Vaping status: Never Used    Substance and Sexual Activity    Alcohol use: Yes     Alcohol/week: 10.0 - 12.0 standard drinks of alcohol     Types: 10 - 12 Standard drinks or equivalent per week     Comment: weekends    Drug use: Yes     Frequency: 7.0 times per week     Types: Cannabis     Comment: smokes plant form nightly    Sexual activity: Yes     Partners: Male     Birth control/protection: I.U.D.     Social Determinants of Health     Food Insecurity: No Food Insecurity (8/11/2024)    Food Insecurity     Food Insecurity: Never true   Transportation Needs: No Transportation Needs (8/11/2024)    Transportation Needs     Lack of Transportation: No   Housing Stability: Low Risk  (8/11/2024)    Housing Stability     Housing Instability: No     Allergies/Medications:   Allergies:   Allergies   Allergen Reactions    Neosporin [Bacitracin-Polymyxin B] RASH     Medications Prior to Admission   Medication Sig    FLUoxetine HCl 40 MG Oral Cap Take 1 capsule (40 mg total) by mouth daily.    Isotretinoin 40 MG Oral Cap Take 2 capsules (80 mg total) by mouth daily.    ibuprofen 600 MG Oral Tab Take 1 tablet (600 mg total) by mouth every 8 (eight) hours as needed for Pain or Fever.    ondansetron 4 MG Oral Tablet Dispersible Take 1 tablet (4 mg total) by mouth every 8 (eight) hours as needed for Nausea.    cefdinir 300 MG Oral Cap Take 1 capsule (300 mg total) by mouth 2 (two) times daily for 10 days. (Patient taking differently: Take 1 capsule (300 mg total) by mouth 2 (two) times daily. 4/10 days completed)    Cetirizine HCl (ZYRTEC OR) Take by mouth.    Levonorgestrel 13.5 MG Intrauterine IUD 1 each by Intrauterine route one time.       Review of Systems:     Constitutional:  Positive for fever.   HENT: Negative.     Eyes: Negative.    Respiratory: Negative.     Cardiovascular: Negative.    Gastrointestinal: Negative.    Endocrine: Negative.    Genitourinary:  Positive for dysuria.   Musculoskeletal: Negative.    Skin: Negative.     Allergic/Immunologic: Negative.    Neurological: Negative.    Hematological: Negative.    Psychiatric/Behavioral: Negative.         Physical Exam:   Vital Signs:  Blood pressure 105/64, pulse 65, temperature 98.4 °F (36.9 °C), temperature source Oral, resp. rate 16, weight 142 lb 1.6 oz (64.5 kg), SpO2 100%, not currently breastfeeding.  Physical Exam  Vitals and nursing note reviewed.   HENT:      Head: Normocephalic and atraumatic.   Eyes:      Pupils: Pupils are equal, round, and reactive to light.   Cardiovascular:      Rate and Rhythm: Normal rate and regular rhythm.      Pulses: Normal pulses.      Heart sounds: Normal heart sounds.   Pulmonary:      Effort: Pulmonary effort is normal.      Breath sounds: Normal breath sounds.   Abdominal:      General: Abdomen is flat. Bowel sounds are normal.      Palpations: Abdomen is soft.   Musculoskeletal:         General: Normal range of motion.      Cervical back: Neck supple.   Skin:     General: Skin is warm and dry.   Neurological:      General: No focal deficit present.      Mental Status: She is alert and oriented to person, place, and time.   Psychiatric:         Mood and Affect: Mood normal.       Cervical Papanicolaou done within 1 year of adm    Results:     Lab Results   Component Value Date    WBC 7.1 08/11/2024    HGB 11.1 (L) 08/11/2024    HCT 32.3 (L) 08/11/2024    .0 08/11/2024    CREATSERUM 0.64 08/11/2024    BUN 7 (L) 08/11/2024     08/11/2024    K 4.7 08/11/2024     08/11/2024    CO2 27.0 08/11/2024    GLU 90 08/11/2024    CA 9.2 08/11/2024    ALB 3.8 08/11/2024    ALKPHO 54 08/11/2024    BILT 0.3 08/11/2024    TP 6.5 08/11/2024    AST 11 08/11/2024    ALT 10 08/11/2024    TSH 1.129 11/30/2017    LIP 26 08/06/2024     No results found.        Assessment/Plan:     Pyelonephritis  Started on Rocephin , ID evaluation  TAQUERIA on fluids  Hypokalemia K normal  Depression on meds  Had lengthy D/W parents at bedside    MDM  Reviewed  previous: labs and ultrasound  Total time providing critical care:  minutes. This excludes time spent performing separately reportable procedures and services.  Consults: primary care provider (ID)                     MERCY OSEI, MD KENROY  8/11/2024

## 2024-08-12 VITALS
TEMPERATURE: 99 F | SYSTOLIC BLOOD PRESSURE: 104 MMHG | BODY MASS INDEX: 25 KG/M2 | HEART RATE: 71 BPM | RESPIRATION RATE: 18 BRPM | WEIGHT: 142.13 LBS | DIASTOLIC BLOOD PRESSURE: 67 MMHG | OXYGEN SATURATION: 98 %

## 2024-08-12 RX ORDER — LEVOFLOXACIN 750 MG/1
750 TABLET, FILM COATED ORAL DAILY
Qty: 10 TABLET | Refills: 0 | Status: SHIPPED | OUTPATIENT
Start: 2024-08-12 | End: 2024-08-22

## 2024-08-12 RX ORDER — LEVOFLOXACIN 750 MG/1
750 TABLET, FILM COATED ORAL DAILY
Status: DISCONTINUED | OUTPATIENT
Start: 2024-08-12 | End: 2024-08-12

## 2024-08-12 NOTE — PLAN OF CARE
Problem: Patient Centered Care  Goal: Patient preferences are identified and integrated in the patient's plan of care  Description: Interventions:  - What would you like us to know as we care for you?   - Provide timely, complete, and accurate information to patient/family  - Incorporate patient and family knowledge, values, beliefs, and cultural backgrounds into the planning and delivery of care  - Encourage patient/family to participate in care and decision-making at the level they choose  - Honor patient and family perspectives and choices  Outcome: Progressing     Outcome: Progressing     Problem: PAIN - ADULT  Goal: Verbalizes/displays adequate comfort level or patient's stated pain goal  Description: INTERVENTIONS:  - Encourage pt to monitor pain and request assistance  - Assess pain using appropriate pain scale  - Administer analgesics based on type and severity of pain and evaluate response  - Implement non-pharmacological measures as appropriate and evaluate response  - Consider cultural and social influences on pain and pain management  - Manage/alleviate anxiety  - Utilize distraction and/or relaxation techniques  - Monitor for opioid side effects  - Notify MD/LIP if interventions unsuccessful or patient reports new pain  - Anticipate increased pain with activity and pre-medicate as appropriate  Outcome: Progressing     Problem: RISK FOR INFECTION - ADULT  Goal: Absence of fever/infection during anticipated neutropenic period  Description: INTERVENTIONS  - Monitor WBC  - Administer growth factors as ordered  - Implement neutropenic guidelines  Outcome: Progressing     Problem: SAFETY ADULT - FALL  Goal: Free from fall injury  Description: INTERVENTIONS:  - Assess pt frequently for physical needs  - Identify cognitive and physical deficits and behaviors that affect risk of falls.  - [place] fall precautions as indicated by assessment.  - Educate pt/family on patient safety including physical  limitations  - Instruct pt to call for assistance with activity based on assessment  - Modify environment to reduce risk of injury  - Provide assistive devices as appropriate  - Consider OT/PT consult to assist with strengthening/mobility  - Encourage toileting schedule  Outcome: Progressing

## 2024-08-12 NOTE — PLAN OF CARE
Problem: PAIN - ADULT  Goal: Verbalizes/displays adequate comfort level or patient's stated pain goal  Description: INTERVENTIONS:  - Encourage pt to monitor pain and request assistance  - Assess pain using appropriate pain scale  - Administer analgesics based on type and severity of pain and evaluate response  - Implement non-pharmacological measures as appropriate and evaluate response  - Consider cultural and social influences on pain and pain management  - Manage/alleviate anxiety  - Utilize distraction and/or relaxation techniques  - Monitor for opioid side effects  - Notify MD/LIP if interventions unsuccessful or patient reports new pain  - Anticipate increased pain with activity and pre-medicate as appropriate  Outcome: Progressing     Problem: RISK FOR INFECTION - ADULT  Goal: Absence of fever/infection during anticipated neutropenic period  Description: INTERVENTIONS  - Monitor WBC  - Administer growth factors as ordered  - Implement neutropenic guidelines  Outcome: Progressing     Problem: SAFETY ADULT - FALL  Goal: Free from fall injury  Description: INTERVENTIONS:  - Assess pt frequently for physical needs  - Identify cognitive and physical deficits and behaviors that affect risk of falls.  - [place] fall precautions as indicated by assessment.  - Educate pt/family on patient safety including physical limitations  - Instruct pt to call for assistance with activity based on assessment  - Modify environment to reduce risk of injury  - Provide assistive devices as appropriate  - Consider OT/PT consult to assist with strengthening/mobility  - Encourage toileting schedule  Outcome: Progressing

## 2024-08-12 NOTE — CONSULTS
[facility]  part of [facility] ID CONSULT NOTE    Valeria Lopez Patient Status:  Observation    3/8/2000 MRN [id number]   Location [facility] 5SW/SE Attending Melanie Roberson MD   Hosp Day # 0 PCP Bob Rodriguez MD       Reason for Consultation:  UTI    ASSESSMENT:    Antibiotics: Ceftriaxone    # Acute UTI with R pyelonephritis      PLAN:    -  on IV ceftriaxone - discontinue   -  give levaquin prior to discharge and continue x10 days  -  Follow fever curve, wbc  -  Reviewed labs, micro, imaging reports, available old records  -  d/w patient, RN, Primary    History of Present Illness:  Valeria Lpoez is a a(n) 24 year old female. Is a 24-year-old female otherwise healthy with ID consult for management of UTI.  Patient is visiting here from [place].  Seen initially in her home on 2024 for urinary symptoms including frequency, flank pain, hematuria, fevers, nausea and treated with Keflex for 7 days.  Now here was seen in the ED on  for right flank pain, nausea.  Took a dose of Levaquin prior to the visit and was discharged on cefdinir for 10 days.  UA at that time showed pyuria but urine culture negative.  CT A/P with cystitis and involvement of the right renal system.  Came back 4 days later on 8/10 now with fevers with persistent flank pain abdominal pain with temperature up to 101.3 renal ultrasound unremarkable with no hydronephrosis.  Started on IV ceftriaxone.  Tmax 90.8.  WBC normal.  Blood cultures no growth.  UA negative.  Clinically feels better.    History:  Past Medical History:    Anxiety    Concussion without loss of consciousness    2017    Depression    Encounter for IUD insertion    Panic attacks    Patellofemoral syndrome, bilateral     Past Surgical History:   Procedure Laterality Date    Mirena, iud  2020    Sinus surgery    2020     Family History   Problem Relation Age of Onset    Cancer Maternal Grandfather     Cancer Other          UNCLE    Arthritis Maternal Grandmother     Thyroid Disorder Maternal Grandmother     Diabetes Paternal Grandfather     Allergies Father     Anxiety Father     Allergies Mother     Anxiety Mother     Anxiety Brother     Depression Brother     Depression Brother     Anxiety Brother     Psychiatric Maternal Uncle       reports that she has never smoked. She has never used smokeless tobacco. She reports current alcohol use of about 10.0 - 12.0 standard drinks of alcohol per week. She reports current drug use. Frequency: 7.00 times per week. Drug: Cannabis.    Allergies:  Allergies   Allergen Reactions    Neosporin [Bacitracin-Polymyxin B] RASH       Medications:    Current Facility-Administered Medications:     cefTRIAXone (Rocephin) 2 g in sodium chloride 0.9% 100 mL IVPB-ADDV, 2 g, Intravenous, Q24H    ketorolac (Toradol) 15 MG/ML injection 15 mg, 15 mg, Intravenous, Q6H PRN    ondansetron (Zofran) 4 MG/2ML injection 4 mg, 4 mg, Intravenous, Q6H PRN    acetaminophen (Tylenol) tab 650 mg, 650 mg, Oral, Q6H PRN    albuterol (Ventolin HFA) 108 (90 Base) MCG/ACT inhaler 1 puff, 1 puff, Inhalation, QID    escitalopram (Lexapro) tab 20 mg, 20 mg, Oral, Nightly    multivitamin with minerals (Thera M Plus) tab 1 tablet, 1 tablet, Oral, Daily    ondansetron (Zofran-ODT) disintegrating tab 4 mg, 4 mg, Oral, Q8H PRN    Review of Systems:  CONSTITUTIONAL:  No weight loss, weakness or fatigue.  HEENT:  Eyes:  No visual loss, blurred vision, double vision or yellow sclerae. Ears, Nose, Throat:  No hearing loss, sneezing, congestion, runny nose or sore throat.  SKIN:  No rash or itching.  CARDIOVASCULAR:  No chest pain, chest pressure or chest discomfort  RESPIRATORY:  No shortness of breath, cough or sputum.  GASTROINTESTINAL:  No anorexia, nausea, vomiting or diarrhea. No abdominal pain or blood.  GENITOURINARY:  No Burning on urination.   NEUROLOGICAL:  No headache, dizziness, syncope, paralysis, ataxia, numbness or  tingling in the extremities.  MUSCULOSKELETAL:  No muscle, back pain, joint pain or stiffness.    Physical Exam:  Vital signs: Blood pressure 98/58, pulse 62, temperature 98.6 °F (37 °C), temperature source Oral, resp. rate 18, weight 142 lb 1.6 oz (64.5 kg), SpO2 95%, not currently breastfeeding.    General: Alert, oriented, NAD  HEENT: Moist mucous membranes. EOMI  Neck: No lymphadenopathy.  Supple.  Cardiovascular: No chest wall tenderness  Respiratory: Symmetric expansion  Abdomen: Soft, nontender, nondistended.   Back: no CVA ttp  Musculoskeletal: No edema noted  Integument: No lesions. No erythema.    Laboratory Data: Reviewed in EMR    Microbiology: Reviewed in EMR    Radiology: Reviewed    Thank you for allowing us to participate in the care of this patient. Please do not hesitate to call if you have any questions.     We will continue to follow with you and will make further recommendations based on his progress.    MD Yris Robles [facility]  [phone]  8/12/2024

## 2024-08-12 NOTE — PROGRESS NOTES
Pt discharged, mom provided transportation home.  Discharge paperwork and prescriptions reviewed, all questions and concerns addressed. IV access discontinued.

## 2024-08-12 NOTE — DISCHARGE SUMMARY
[facility]  part of [facility]    Discharge Summary    Valeria Lopez Patient Status:  Observation    3/8/2000 MRN [id number]   Location [facility] 5SW/SE Attending Melanie Roberson MD   Hosp Day # 0 PCP Bob Rodriguez MD     Date of Admission: 8/10/2024   Date of Discharge: 2024    Admitting Diagnosis: Pyelonephritis [N12]  Right flank pain [R10.9]    Disposition: Home    Discharge Diagnosis: .Principal Problem:    Pyelonephritis  Active Problems:    Right flank pain      Hospital Course:   Reason for Admission: UTI, Fever    Discharge Physical Exam: General appearance:  alert, appears stated age, and cooperative  Head: Normocephalic, without obvious abnormality, atraumatic  Pulmonary: clear to auscultation bilaterally  Cardiovascular: S1, S2 normal, no murmur, click, rub or gallop, regular rate and rhythm, S1, S2 normal  Abdominal: soft, non-tender; bowel sounds normal; no masses,  no organomegaly  Extremities: extremities normal, atraumatic, no cyanosis or edema  Pulses: 2+ and symmetric  Skin: Skin color, texture, turgor normal. No rashes or lesions    Hospital Course: Pt started on Rocephin, doing better renal US negative DC home    Complications: None    Consultants         Provider   Role Specialty     Da White MD      Consulting Physician INFECTIOUS DISEASES            Pending Labs       Order Current Status    Blood Culture Preliminary result    Blood Culture Preliminary result            Discharge Plan:   Discharge Condition: Stable    Current Discharge Medication List        Home Meds - Unchanged    Details   Isotretinoin 40 MG Oral Cap Take 2 capsules (80 mg total) by mouth daily.      Levonorgestrel 13.5 MG Intrauterine IUD 1 each by Intrauterine route one time.                 Discharge Diet: As tolerated    Discharge Activity: As tolerated    Follow up:       Time spent 35 mins  D/W RN        MERCY OSEI, MD MELANIE  2024